# Patient Record
Sex: MALE | Race: WHITE | NOT HISPANIC OR LATINO | Employment: FULL TIME | URBAN - METROPOLITAN AREA
[De-identification: names, ages, dates, MRNs, and addresses within clinical notes are randomized per-mention and may not be internally consistent; named-entity substitution may affect disease eponyms.]

---

## 2017-03-30 ENCOUNTER — OFFICE VISIT (OUTPATIENT)
Dept: FAMILY MEDICINE | Age: 22
End: 2017-03-30

## 2017-03-30 VITALS
BODY MASS INDEX: 23.19 KG/M2 | HEIGHT: 72 IN | DIASTOLIC BLOOD PRESSURE: 64 MMHG | WEIGHT: 171.2 LBS | SYSTOLIC BLOOD PRESSURE: 132 MMHG | HEART RATE: 76 BPM

## 2017-03-30 DIAGNOSIS — M79.604 RIGHT LEG PAIN: ICD-10-CM

## 2017-03-30 DIAGNOSIS — Z00.00 ANNUAL PHYSICAL EXAM: Primary | ICD-10-CM

## 2017-03-30 PROCEDURE — 99395 PREV VISIT EST AGE 18-39: CPT | Performed by: FAMILY MEDICINE

## 2017-05-11 ENCOUNTER — E-ADVICE (OUTPATIENT)
Dept: FAMILY MEDICINE | Age: 22
End: 2017-05-11

## 2017-05-11 DIAGNOSIS — N50.9 TESTICLE TROUBLE: Primary | ICD-10-CM

## 2017-05-24 ENCOUNTER — OFFICE VISIT (OUTPATIENT)
Dept: UROLOGY | Age: 22
End: 2017-05-24
Attending: FAMILY MEDICINE

## 2017-05-24 VITALS
SYSTOLIC BLOOD PRESSURE: 130 MMHG | BODY MASS INDEX: 23.06 KG/M2 | DIASTOLIC BLOOD PRESSURE: 70 MMHG | HEART RATE: 72 BPM | WEIGHT: 170 LBS

## 2017-05-24 DIAGNOSIS — N50.9 TESTICULAR DISORDER: Primary | ICD-10-CM

## 2017-05-24 LAB
BILIRUB UR QL: NORMAL
CLARITY UR: CLEAR
COLOR UR: YELLOW
CULT IF POS: NORMAL
GLUCOSE UR-MCNC: NORMAL MG/DL
HGB UR QL: NORMAL
KETONES UR-MCNC: NORMAL MG/DL
LEUKOCYTE ESTERASE UR QL STRIP: NORMAL
NITRITE UR QL: NORMAL
PH UR: 7.5 [PH]
PROT UR QL: NORMAL
SP GR UR STRIP: 1.02
UROBILINOGEN UR STRIP-MCNC: NORMAL MG/DL

## 2017-05-24 PROCEDURE — 81003 URINALYSIS AUTO W/O SCOPE: CPT | Performed by: UROLOGY

## 2017-05-24 PROCEDURE — 99242 OFF/OP CONSLTJ NEW/EST SF 20: CPT | Performed by: UROLOGY

## 2017-08-24 ENCOUNTER — E-ADVICE (OUTPATIENT)
Dept: FAMILY MEDICINE | Age: 22
End: 2017-08-24

## 2017-08-24 DIAGNOSIS — Z13.220 LIPID SCREENING: Primary | ICD-10-CM

## 2018-07-24 ENCOUNTER — IMAGING SERVICES (OUTPATIENT)
Dept: GENERAL RADIOLOGY | Age: 23
End: 2018-07-24
Attending: FAMILY MEDICINE

## 2018-07-24 ENCOUNTER — OFFICE VISIT (OUTPATIENT)
Dept: FAMILY MEDICINE | Age: 23
End: 2018-07-24

## 2018-07-24 VITALS
WEIGHT: 171.2 LBS | SYSTOLIC BLOOD PRESSURE: 146 MMHG | BODY MASS INDEX: 23.22 KG/M2 | DIASTOLIC BLOOD PRESSURE: 96 MMHG | HEART RATE: 64 BPM

## 2018-07-24 DIAGNOSIS — R03.0 BLOOD PRESSURE ELEVATED WITHOUT HISTORY OF HTN: ICD-10-CM

## 2018-07-24 DIAGNOSIS — R07.0 THROAT PAIN: ICD-10-CM

## 2018-07-24 DIAGNOSIS — R07.0 THROAT PAIN: Primary | ICD-10-CM

## 2018-07-24 PROCEDURE — 99214 OFFICE O/P EST MOD 30 MIN: CPT | Performed by: FAMILY MEDICINE

## 2018-07-24 PROCEDURE — 70360 X-RAY EXAM OF NECK: CPT | Performed by: RADIOLOGY

## 2018-07-24 ASSESSMENT — PATIENT HEALTH QUESTIONNAIRE - PHQ9
SUM OF ALL RESPONSES TO PHQ9 QUESTIONS 1 AND 2: 0
CLINICAL INTERPRETATION OF PHQ2 SCORE: 0

## 2018-09-17 ENCOUNTER — OFFICE VISIT (OUTPATIENT)
Dept: FAMILY MEDICINE | Age: 23
End: 2018-09-17

## 2018-09-17 ENCOUNTER — LAB SERVICES (OUTPATIENT)
Dept: LAB | Age: 23
End: 2018-09-17

## 2018-09-17 VITALS
HEIGHT: 73 IN | BODY MASS INDEX: 22.58 KG/M2 | HEART RATE: 76 BPM | DIASTOLIC BLOOD PRESSURE: 82 MMHG | SYSTOLIC BLOOD PRESSURE: 138 MMHG | WEIGHT: 170.4 LBS

## 2018-09-17 DIAGNOSIS — Z13.1 SCREENING FOR DIABETES MELLITUS (DM): ICD-10-CM

## 2018-09-17 DIAGNOSIS — Z23 NEED FOR VACCINATION: ICD-10-CM

## 2018-09-17 DIAGNOSIS — Z13.220 LIPID SCREENING: ICD-10-CM

## 2018-09-17 DIAGNOSIS — Z00.00 ANNUAL PHYSICAL EXAM: Primary | ICD-10-CM

## 2018-09-17 LAB
FASTING STATUS PATIENT QL REPORTED: 12 HRS
GLUCOSE SERPL-MCNC: 89 MG/DL (ref 65–99)

## 2018-09-17 PROCEDURE — 90688 IIV4 VACCINE SPLT 0.5 ML IM: CPT | Performed by: FAMILY MEDICINE

## 2018-09-17 PROCEDURE — 80061 LIPID PANEL: CPT | Performed by: INTERNAL MEDICINE

## 2018-09-17 PROCEDURE — 99395 PREV VISIT EST AGE 18-39: CPT | Performed by: FAMILY MEDICINE

## 2018-09-17 PROCEDURE — 82947 ASSAY GLUCOSE BLOOD QUANT: CPT | Performed by: INTERNAL MEDICINE

## 2018-09-17 PROCEDURE — 90471 IMMUNIZATION ADMIN: CPT | Performed by: FAMILY MEDICINE

## 2018-09-17 PROCEDURE — 36415 COLL VENOUS BLD VENIPUNCTURE: CPT | Performed by: INTERNAL MEDICINE

## 2018-09-18 LAB
CHOLEST SERPL-MCNC: 119 MG/DL
CHOLEST/HDLC SERPL: 1.9 {RATIO}
HDLC SERPL-MCNC: 64 MG/DL
LDLC SERPL-MCNC: 47 MG/DL
LENGTH OF FAST TIME PATIENT: 12 HRS
NONHDLC SERPL-MCNC: 55 MG/DL
TRIGL SERPL-MCNC: 38 MG/DL

## 2018-09-20 ENCOUNTER — E-ADVICE (OUTPATIENT)
Dept: FAMILY MEDICINE | Age: 23
End: 2018-09-20

## 2018-12-22 ENCOUNTER — HOSPITAL ENCOUNTER (EMERGENCY)
Age: 23
Discharge: HOME | End: 2018-12-22
Attending: EMERGENCY MEDICINE

## 2018-12-22 ENCOUNTER — APPOINTMENT (OUTPATIENT)
Dept: GENERAL RADIOLOGY | Age: 23
End: 2018-12-22
Attending: EMERGENCY MEDICINE

## 2018-12-22 VITALS
DIASTOLIC BLOOD PRESSURE: 69 MMHG | TEMPERATURE: 97.3 F | BODY MASS INDEX: 23.03 KG/M2 | HEIGHT: 72 IN | RESPIRATION RATE: 18 BRPM | HEART RATE: 88 BPM | WEIGHT: 170 LBS | OXYGEN SATURATION: 98 % | SYSTOLIC BLOOD PRESSURE: 128 MMHG

## 2018-12-22 DIAGNOSIS — J02.9 ACUTE PHARYNGITIS, UNSPECIFIED ETIOLOGY: Primary | ICD-10-CM

## 2018-12-22 LAB
FLUAV AG SPEC QL IF: NEGATIVE
FLUBV AG SPEC QL IF: NEGATIVE
S PYO AG THROAT QL: NEGATIVE
SPECIMEN SOURCE: NORMAL

## 2018-12-22 PROCEDURE — 87880 STREP A ASSAY W/OPTIC: CPT

## 2018-12-22 PROCEDURE — 71046 X-RAY EXAM CHEST 2 VIEWS: CPT | Performed by: RADIOLOGY

## 2018-12-22 PROCEDURE — 71046 X-RAY EXAM CHEST 2 VIEWS: CPT

## 2018-12-22 PROCEDURE — 99282 EMERGENCY DEPT VISIT SF MDM: CPT | Performed by: EMERGENCY MEDICINE

## 2018-12-22 PROCEDURE — 99283 EMERGENCY DEPT VISIT LOW MDM: CPT

## 2018-12-22 PROCEDURE — 87804 INFLUENZA ASSAY W/OPTIC: CPT

## 2018-12-22 PROCEDURE — 87081 CULTURE SCREEN ONLY: CPT

## 2018-12-22 ASSESSMENT — PAIN SCALES - GENERAL
PAINLEVEL_OUTOF10: 5
PAINLEVEL_OUTOF10: 5
PAINLEVEL_OUTOF10: 2

## 2018-12-22 ASSESSMENT — PAIN DESCRIPTION - PAIN TYPE: TYPE: .ACUTE PAIN

## 2018-12-24 LAB
REPORT STATUS (RPT): NORMAL
S PYO SPEC QL CULT: NORMAL
SPECIMEN SOURCE: NORMAL

## 2019-02-19 ENCOUNTER — E-ADVICE (OUTPATIENT)
Dept: FAMILY MEDICINE | Age: 24
End: 2019-02-19

## 2019-02-19 DIAGNOSIS — M25.559 ARTHRALGIA OF HIP, UNSPECIFIED LATERALITY: Primary | ICD-10-CM

## 2019-08-26 ENCOUNTER — OFFICE VISIT (OUTPATIENT)
Dept: FAMILY MEDICINE | Age: 24
End: 2019-08-26

## 2019-08-26 VITALS
SYSTOLIC BLOOD PRESSURE: 132 MMHG | DIASTOLIC BLOOD PRESSURE: 80 MMHG | HEIGHT: 71 IN | WEIGHT: 175.16 LBS | BODY MASS INDEX: 24.52 KG/M2 | HEART RATE: 84 BPM

## 2019-08-26 DIAGNOSIS — Z00.00 ANNUAL PHYSICAL EXAM: Primary | ICD-10-CM

## 2019-08-26 DIAGNOSIS — Z13.220 LIPID SCREENING: ICD-10-CM

## 2019-08-26 DIAGNOSIS — Z13.1 SCREENING FOR DIABETES MELLITUS (DM): ICD-10-CM

## 2019-08-26 PROCEDURE — 99395 PREV VISIT EST AGE 18-39: CPT | Performed by: FAMILY MEDICINE

## 2019-08-26 ASSESSMENT — PATIENT HEALTH QUESTIONNAIRE - PHQ9
2. FEELING DOWN, DEPRESSED OR HOPELESS: NOT AT ALL
SUM OF ALL RESPONSES TO PHQ9 QUESTIONS 1 AND 2: 0
SUM OF ALL RESPONSES TO PHQ9 QUESTIONS 1 AND 2: 0
1. LITTLE INTEREST OR PLEASURE IN DOING THINGS: NOT AT ALL

## 2020-02-23 ENCOUNTER — E-ADVICE (OUTPATIENT)
Dept: FAMILY MEDICINE | Age: 25
End: 2020-02-23

## 2020-03-04 ENCOUNTER — OFFICE VISIT (OUTPATIENT)
Dept: OPHTHALMOLOGY | Age: 25
End: 2020-03-04

## 2020-03-04 DIAGNOSIS — H52.203 ASTIGMATISM OF BOTH EYES, UNSPECIFIED TYPE: Primary | ICD-10-CM

## 2020-03-04 DIAGNOSIS — H43.393 VITREOUS FLOATERS, BILATERAL: ICD-10-CM

## 2020-03-04 DIAGNOSIS — H04.123 DRY EYES, BILATERAL: ICD-10-CM

## 2020-03-04 DIAGNOSIS — H10.13 ACUTE ATOPIC CONJUNCTIVITIS, BILATERAL: ICD-10-CM

## 2020-03-04 PROCEDURE — 92015 DETERMINE REFRACTIVE STATE: CPT | Performed by: OPTOMETRIST

## 2020-03-04 PROCEDURE — 92004 COMPRE OPH EXAM NEW PT 1/>: CPT | Performed by: OPTOMETRIST

## 2020-03-04 ASSESSMENT — VISUAL ACUITY
OS_CC: 20/20
METHOD: SNELLEN - LINEAR
OD_CC: 20/20
OS_SC: 20/25
OS_SC+: -1
OD_SC: 20/25
OD_SC+: -1

## 2020-03-04 ASSESSMENT — REFRACTION_WEARINGRX
OD_SPHERE: -0.50
OS_AXIS: 095
OS_AXIS: 095
OS_CYLINDER: +0.75
OD_AXIS: 095
OS_CYLINDER: +0.75
OD_CYLINDER: +0.75
OD_AXIS: 095
OD_CYLINDER: +0.75
OD_SPHERE: -0.25
OS_SPHERE: -0.25
OS_SPHERE: -0.25

## 2020-03-04 ASSESSMENT — TONOMETRY
IOP_METHOD: APPLANATION
OD_IOP_MMHG: 15
OS_IOP_MMHG: 14

## 2020-03-04 ASSESSMENT — EXTERNAL EXAM - RIGHT EYE: OD_EXAM: NORMAL

## 2020-03-04 ASSESSMENT — EXTERNAL EXAM - LEFT EYE: OS_EXAM: NORMAL

## 2020-03-04 ASSESSMENT — CUP TO DISC RATIO
OD_RATIO: .35
OS_RATIO: .35

## 2020-03-04 ASSESSMENT — REFRACTION_MANIFEST
OD_CYLINDER: +0.50
OS_AXIS: 080
OD_SPHERE: -0.75
OS_CYLINDER: +0.50
OD_AXIS: 075
OS_SPHERE: -0.75

## 2020-03-04 ASSESSMENT — SLIT LAMP EXAM - LIDS
COMMENTS: NORMAL
COMMENTS: NORMAL

## 2020-03-04 ASSESSMENT — CONF VISUAL FIELD
OD_NORMAL: 1
METHOD: COUNTING FINGERS
OS_NORMAL: 1

## 2020-09-28 ENCOUNTER — E-ADVICE (OUTPATIENT)
Dept: FAMILY MEDICINE | Age: 25
End: 2020-09-28

## 2020-09-28 DIAGNOSIS — R20.2 PARESTHESIA OF RIGHT LEG: Primary | ICD-10-CM

## 2020-10-07 ENCOUNTER — OFFICE VISIT (OUTPATIENT)
Dept: REHABILITATION | Age: 25
End: 2020-10-07
Attending: FAMILY MEDICINE

## 2020-10-07 DIAGNOSIS — G57.81 NERVE ENTRAPMENT OF LOWER LIMB, RIGHT: Primary | ICD-10-CM

## 2020-10-07 PROCEDURE — 97110 THERAPEUTIC EXERCISES: CPT | Performed by: PHYSICAL THERAPIST

## 2020-10-07 PROCEDURE — 97161 PT EVAL LOW COMPLEX 20 MIN: CPT | Performed by: PHYSICAL THERAPIST

## 2020-10-07 ASSESSMENT — MOVEMENT AND STRENGTH ASSESSMENTS
SQUATTING: NO DIFFICULTY
WALKING 2 BLOCKS: NO DIFFICULTY
PERFORMING LIGHT ACTIVITES AROUND YOUR HOME: NO DIFFICULTY
PERFORMING HEAVY ACTIVITIES AROUND YOUR HOME: A LITTLE BIT OF DIFFICULTY
SITTING FOR 1 HOUR: NO DIFFICULTY
GOING UP OR DOWN 10 STAIRS (ABOUT 1 FLIGHT OF STAIRS): NO DIFFICULTY
GETTING INTO OR OUT OF THE BATH: NO DIFFICULTY
MAKING SHARP TURNS WHILE RUNNING FAST: MODERATE DIFFICULTY
LIFTING AN OBJECT, LIKE A BAG OF GROCERIES, FROM THE FLOOR: NO DIFFICULTY
WALKING A MILE: NO DIFFICULTY
WALKING BETWEEN ROOMS: NO DIFFICULTY
RUNNING ON EVEN GROUND: MODERATE DIFFICULTY
YOUR USUAL HOBBIES, RECREATIONAL OR SPORTING ACTIVIITIES: MODERATE DIFFICULTY
ANY OF YOUR USUAL WORK, HOUSEWORK OR SCHOOL ACTIVITIES: NO DIFFICULTY
HOPPING: A LITTLE BIT OF DIFFICULTY
ROLLING OVER IN BED: NO DIFFICULTY
STANDING FOR 1 HOUR: A LITTLE BIT OF DIFFICULTY
TOTAL SCORE: 86.25
RUNNING ON UNEVEN GROUND: MODERATE DIFFICULTY
PUTTING ON YOUR SHOES OR SOCKS: NO DIFFICULTY
GETTING INTO OR OUT OF A CAR: NO DIFFICULTY

## 2020-10-07 ASSESSMENT — ENCOUNTER SYMPTOMS
QUALITY: ACHE
PAIN SEVERITY NOW: 0
QUALITY: PINS AND NEEDLES
QUALITY: SHARP
PAIN SCALE AT HIGHEST: 5
SUBJECTIVE PAIN PROGRESSION: IMPROVED
ALLEVIATING FACTORS: AVOIDING MOVEMENT IN INVOLVED AREA
QUALITY: TIGHT

## 2020-10-15 ENCOUNTER — OFFICE VISIT (OUTPATIENT)
Dept: FAMILY MEDICINE | Age: 25
End: 2020-10-15

## 2020-10-15 VITALS
SYSTOLIC BLOOD PRESSURE: 130 MMHG | HEIGHT: 72 IN | DIASTOLIC BLOOD PRESSURE: 90 MMHG | WEIGHT: 173.61 LBS | HEART RATE: 72 BPM | BODY MASS INDEX: 23.52 KG/M2

## 2020-10-15 DIAGNOSIS — Z13.1 SCREENING FOR DIABETES MELLITUS (DM): ICD-10-CM

## 2020-10-15 DIAGNOSIS — Z23 NEED FOR VACCINATION: ICD-10-CM

## 2020-10-15 DIAGNOSIS — Z00.00 ANNUAL PHYSICAL EXAM: Primary | ICD-10-CM

## 2020-10-15 DIAGNOSIS — Z13.220 LIPID SCREENING: ICD-10-CM

## 2020-10-15 PROCEDURE — 90471 IMMUNIZATION ADMIN: CPT | Performed by: FAMILY MEDICINE

## 2020-10-15 PROCEDURE — 90715 TDAP VACCINE 7 YRS/> IM: CPT | Performed by: FAMILY MEDICINE

## 2020-10-15 PROCEDURE — 90686 IIV4 VACC NO PRSV 0.5 ML IM: CPT | Performed by: FAMILY MEDICINE

## 2020-10-15 PROCEDURE — 90472 IMMUNIZATION ADMIN EACH ADD: CPT | Performed by: FAMILY MEDICINE

## 2020-10-15 PROCEDURE — 99395 PREV VISIT EST AGE 18-39: CPT | Performed by: FAMILY MEDICINE

## 2020-10-15 SDOH — HEALTH STABILITY: MENTAL HEALTH: HOW OFTEN DO YOU HAVE A DRINK CONTAINING ALCOHOL?: 2-3 TIMES A WEEK

## 2020-10-15 SDOH — HEALTH STABILITY: MENTAL HEALTH: HOW OFTEN DO YOU HAVE 6 OR MORE DRINKS ON ONE OCCASION?: LESS THAN MONTHLY

## 2020-10-15 SDOH — HEALTH STABILITY: MENTAL HEALTH: HOW MANY STANDARD DRINKS CONTAINING ALCOHOL DO YOU HAVE ON A TYPICAL DAY?: 1 OR 2

## 2020-10-15 ASSESSMENT — PATIENT HEALTH QUESTIONNAIRE - PHQ9
CLINICAL INTERPRETATION OF PHQ9 SCORE: NO FURTHER SCREENING NEEDED
1. LITTLE INTEREST OR PLEASURE IN DOING THINGS: NOT AT ALL
CLINICAL INTERPRETATION OF PHQ2 SCORE: NO FURTHER SCREENING NEEDED
2. FEELING DOWN, DEPRESSED OR HOPELESS: NOT AT ALL
SUM OF ALL RESPONSES TO PHQ9 QUESTIONS 1 AND 2: 0
SUM OF ALL RESPONSES TO PHQ9 QUESTIONS 1 AND 2: 0

## 2022-01-02 ENCOUNTER — E-ADVICE (OUTPATIENT)
Dept: FAMILY MEDICINE | Age: 27
End: 2022-01-02

## 2022-03-04 ENCOUNTER — OFFICE VISIT (OUTPATIENT)
Dept: FAMILY MEDICINE | Age: 27
End: 2022-03-04

## 2022-03-04 ENCOUNTER — LAB SERVICES (OUTPATIENT)
Dept: LAB | Age: 27
End: 2022-03-04

## 2022-03-04 VITALS
HEIGHT: 72 IN | HEART RATE: 80 BPM | DIASTOLIC BLOOD PRESSURE: 76 MMHG | BODY MASS INDEX: 24.95 KG/M2 | OXYGEN SATURATION: 99 % | RESPIRATION RATE: 16 BRPM | SYSTOLIC BLOOD PRESSURE: 124 MMHG | WEIGHT: 184.2 LBS

## 2022-03-04 DIAGNOSIS — F41.9 ANXIETY: ICD-10-CM

## 2022-03-04 DIAGNOSIS — Z00.00 ANNUAL PHYSICAL EXAM: Primary | ICD-10-CM

## 2022-03-04 DIAGNOSIS — Z13.1 SCREENING FOR DIABETES MELLITUS (DM): ICD-10-CM

## 2022-03-04 DIAGNOSIS — Z13.220 LIPID SCREENING: ICD-10-CM

## 2022-03-04 DIAGNOSIS — Z23 NEED FOR VACCINATION: ICD-10-CM

## 2022-03-04 LAB
FASTING DURATION TIME PATIENT: 12 HOURS (ref 0–999)
GLUCOSE SERPL-MCNC: 94 MG/DL (ref 70–99)

## 2022-03-04 PROCEDURE — 99395 PREV VISIT EST AGE 18-39: CPT | Performed by: FAMILY MEDICINE

## 2022-03-04 PROCEDURE — 36415 COLL VENOUS BLD VENIPUNCTURE: CPT | Performed by: INTERNAL MEDICINE

## 2022-03-04 PROCEDURE — 90471 IMMUNIZATION ADMIN: CPT | Performed by: FAMILY MEDICINE

## 2022-03-04 PROCEDURE — 80061 LIPID PANEL: CPT | Performed by: INTERNAL MEDICINE

## 2022-03-04 PROCEDURE — 82947 ASSAY GLUCOSE BLOOD QUANT: CPT | Performed by: INTERNAL MEDICINE

## 2022-03-04 PROCEDURE — 90686 IIV4 VACC NO PRSV 0.5 ML IM: CPT | Performed by: FAMILY MEDICINE

## 2022-03-04 RX ORDER — PROPRANOLOL HYDROCHLORIDE 40 MG/1
40 TABLET ORAL DAILY PRN
Qty: 20 TABLET | Refills: 1 | Status: SHIPPED | OUTPATIENT
Start: 2022-03-04

## 2022-03-04 ASSESSMENT — PATIENT HEALTH QUESTIONNAIRE - PHQ9
SUM OF ALL RESPONSES TO PHQ9 QUESTIONS 1 AND 2: 0
SUM OF ALL RESPONSES TO PHQ9 QUESTIONS 1 AND 2: 0
2. FEELING DOWN, DEPRESSED OR HOPELESS: NOT AT ALL
1. LITTLE INTEREST OR PLEASURE IN DOING THINGS: NOT AT ALL
CLINICAL INTERPRETATION OF PHQ2 SCORE: NO FURTHER SCREENING NEEDED

## 2022-03-05 LAB
CHOLEST SERPL-MCNC: 123 MG/DL
CHOLEST/HDLC SERPL: 1.6 {RATIO}
FASTING DURATION TIME PATIENT: 12 HOURS (ref 0–999)
HDLC SERPL-MCNC: 79 MG/DL
LDLC SERPL CALC-MCNC: 36 MG/DL
NONHDLC SERPL-MCNC: 44 MG/DL
TRIGL SERPL-MCNC: 39 MG/DL

## 2023-07-24 ENCOUNTER — TELEPHONE (OUTPATIENT)
Dept: URGENT CARE | Facility: URGENT CARE | Age: 28
End: 2023-07-24

## 2023-07-24 ENCOUNTER — OFFICE VISIT (OUTPATIENT)
Dept: URGENT CARE | Facility: URGENT CARE | Age: 28
End: 2023-07-24
Payer: COMMERCIAL

## 2023-07-24 ENCOUNTER — ANCILLARY PROCEDURE (OUTPATIENT)
Dept: GENERAL RADIOLOGY | Facility: CLINIC | Age: 28
End: 2023-07-24
Attending: NURSE PRACTITIONER
Payer: COMMERCIAL

## 2023-07-24 VITALS
TEMPERATURE: 98.3 F | HEART RATE: 77 BPM | DIASTOLIC BLOOD PRESSURE: 87 MMHG | OXYGEN SATURATION: 98 % | SYSTOLIC BLOOD PRESSURE: 137 MMHG | WEIGHT: 182 LBS

## 2023-07-24 DIAGNOSIS — M25.552 HIP PAIN, LEFT: ICD-10-CM

## 2023-07-24 DIAGNOSIS — M25.552 HIP PAIN, LEFT: Primary | ICD-10-CM

## 2023-07-24 DIAGNOSIS — S76.312A STRAIN OF LEFT HAMSTRING MUSCLE, INITIAL ENCOUNTER: ICD-10-CM

## 2023-07-24 PROCEDURE — 99203 OFFICE O/P NEW LOW 30 MIN: CPT | Performed by: NURSE PRACTITIONER

## 2023-07-24 PROCEDURE — 73522 X-RAY EXAM HIPS BI 3-4 VIEWS: CPT | Mod: TC | Performed by: RADIOLOGY

## 2023-07-24 ASSESSMENT — ENCOUNTER SYMPTOMS
BACK PAIN: 0
NECK STIFFNESS: 0
NECK PAIN: 0
FATIGUE: 0
FEVER: 0

## 2023-07-24 NOTE — TELEPHONE ENCOUNTER
Patient called in stating their pain was mush worse. I notified the provider and patient was referred to [facility]. Patient is aware and states he has no further questions or concerns at this time.     SIMONE BREWER MA on 7/24/2023 at 5:47 PM

## 2023-07-24 NOTE — PATIENT INSTRUCTIONS
Use ibuprofen as needed along with diclofenac gel. Follow up with PT and ortho as needed. If anything worsens go to ER.

## 2023-07-24 NOTE — PROGRESS NOTES
Assessment & Plan       ICD-10-CM    1. Hip pain, left  M25.552 XR Pelvis w 2 VW Hips Bilateral      2. Strain of left hamstring muscle, initial encounter  S76.312A diclofenac (VOLTAREN) 1 % topical gel     Orthopedic  Referral     Physical Therapy Referral           Patient instructions:  Use ibuprofen as needed along with diclofenac gel. Follow up with PT and ortho as needed. If anything worsens go to ER.     Medical decision making:  Pt presents with hip/glute/hamstring pain that started after bouldering earlier today. Xray ordered and no acute fracture noted by radiology. Pt is ambulatory in clinic reporting pain worse with reaching toward feet. Feel this is most likely a pulled or torn hamstring where he needs to follow up with Ortho if pain does not improve over the next few days. Rx given for diclofenac to take along with ibuprofen. If no improvement follow up with ortho, any worsening symptoms go to ER. Pt agrees with plan. Ace wrap given.     Results for orders placed or performed in visit on 07/24/23   XR Pelvis w 2 VW Hips Bilateral     Status: None    Narrative    XR PELVIS W 2 VW HIPS BILATERAL  7/24/2023 3:34 PM     HISTORY: Hip pain, left  COMPARISON: None      Impression    IMPRESSION: No acute fracture or malalignment. There is normal joint  spacing.     FRANKY BROCK MD         SYSTEM ID:  [id number]         No follow-ups on file.    At the end of the encounter, I discussed results, diagnosis, medications. Discussed red flags for immediate return to clinic/ER, as well as indications for follow up if no improvement. Patient understood and agreed to plan. Patient was stable for discharge.    Usha Bernardo is a 28 year old male who presents to clinic today the following health issues:  Chief Complaint   Patient presents with    Musculoskeletal Problem     Start today-1 hr ago bouldering sx left hip pain currently-4/10 at worst 10/10 heard a pop, ROM limited, dragging left leg tx  none      Pt reports he was bouldering earlier today and went to push off his leg when he felt a pop in his left him that radiated down his hamstring. Ambulatory but with limp. Reports pain worse with bending over. Denies any numbness, tingling, weakness to leg.             Review of Systems   Constitutional:  Negative for fatigue and fever.   Musculoskeletal:  Positive for gait problem. Negative for back pain, neck pain and neck stiffness.       Problem List:  There are no relevant problems documented for this patient.      History reviewed. No pertinent past medical history.    Social History     Tobacco Use    Smoking status: Never    Smokeless tobacco: Never   Substance Use Topics    Alcohol use: Not on file           Objective    /87   Pulse 77   Temp 98.3  F (36.8  C)   Wt 82.6 kg (182 lb)   SpO2 98%   Physical Exam  Constitutional:       Appearance: Normal appearance. He is not ill-appearing, toxic-appearing or diaphoretic.   Cardiovascular:      Rate and Rhythm: Normal rate.   Pulmonary:      Effort: Pulmonary effort is normal.   Musculoskeletal:        Legs:       Comments: Pain in left hamstring from glute muscle down to above knee. Increased with movement. No increased swelling or redness noted. Full ROM of hip joint noted. No popping or clicking with movement.    Skin:     Capillary Refill: Capillary refill takes less than 2 seconds.   Neurological:      Mental Status: He is alert.              YVETTE OJEDA CNP

## 2023-08-07 ENCOUNTER — OFFICE VISIT (OUTPATIENT)
Dept: FAMILY MEDICINE | Facility: CLINIC | Age: 28
End: 2023-08-07
Payer: COMMERCIAL

## 2023-08-07 VITALS
OXYGEN SATURATION: 99 % | BODY MASS INDEX: 24.19 KG/M2 | DIASTOLIC BLOOD PRESSURE: 66 MMHG | RESPIRATION RATE: 20 BRPM | TEMPERATURE: 98.3 F | WEIGHT: 178.6 LBS | HEIGHT: 72 IN | SYSTOLIC BLOOD PRESSURE: 126 MMHG | HEART RATE: 72 BPM

## 2023-08-07 DIAGNOSIS — S76.312A RUPTURE OF LEFT PROXIMAL HAMSTRING TENDON, INITIAL ENCOUNTER: ICD-10-CM

## 2023-08-07 DIAGNOSIS — Z01.818 PRE-OP EXAM: Primary | ICD-10-CM

## 2023-08-07 LAB — HGB BLD-MCNC: 14 G/DL (ref 13.3–17.7)

## 2023-08-07 PROCEDURE — 99214 OFFICE O/P EST MOD 30 MIN: CPT | Performed by: FAMILY MEDICINE

## 2023-08-07 PROCEDURE — 85018 HEMOGLOBIN: CPT | Performed by: FAMILY MEDICINE

## 2023-08-07 PROCEDURE — 36415 COLL VENOUS BLD VENIPUNCTURE: CPT | Performed by: FAMILY MEDICINE

## 2023-08-07 ASSESSMENT — PAIN SCALES - GENERAL: PAINLEVEL: MODERATE PAIN (4)

## 2023-08-07 NOTE — PROGRESS NOTES
[address] [address]  Phone: [phone]  Primary Provider: No Ref-Primary, Physician  Pre-op Performing Provider: EMANUEL AVENDANO      PREOPERATIVE EVALUATION:  Today's date: 8/7/2023    Az Capps is a 28 year old male who presents for a preoperative evaluation.      8/7/2023     3:52 PM   Additional Questions   Roomed by margarito       Surgical Information:  Surgery/Procedure: Hamstring repair   Surgery Location: [facility]  Surgeon: Dr Alejandro Noonan  Surgery Date: 8/8/2023  Time of Surgery: tbd  Where patient plans to recover: At home with family  Fax number for surgical facility: [phone]    Assessment & Plan     The proposed surgical procedure is considered LOW risk.    Pre-op exam    - Hemoglobin; Future  - Hemoglobin    Rupture of left proximal hamstring tendon, initial encounter    - Hemoglobin; Future  - Hemoglobin            - No identified additional risk factors other than previously addressed    Antiplatelet or Anticoagulation Medication Instructions:   - Patient is on no antiplatelet or anticoagulation medications.    Additional Medication Instructions:  Patient is on no additional chronic medications    RECOMMENDATION:  APPROVAL GIVEN to proceed with proposed procedure, without further diagnostic evaluation.          Subjective             8/7/2023     3:48 PM   Preop Questions   1. Have you ever had a heart attack or stroke? No   2. Have you ever had surgery on your heart or blood vessels, such as a stent placement, a coronary artery bypass, or surgery on an artery in your head, neck, heart, or legs? No   3. Do you have chest pain with activity? No   4. Do you have a history of  heart failure? No   5. Do you currently have a cold, bronchitis or symptoms of other infection? No   6. Do you have a cough, shortness of breath, or wheezing? No   7. Do you or anyone in your family have previous history of blood clots? No   8. Do you or  does anyone in your family have a serious bleeding problem such as prolonged bleeding following surgeries or cuts? No   9. Have you ever had problems with anemia or been told to take iron pills? No   10. Have you had any abnormal blood loss such as black, tarry or bloody stools? No   11. Have you ever had a blood transfusion? No   12. Are you willing to have a blood transfusion if it is medically needed before, during, or after your surgery? Yes   13. Have you or any of your relatives ever had problems with anesthesia? No   14. Do you have sleep apnea, excessive snoring or daytime drowsiness? No   15. Do you have any artifical heart valves or other implanted medical devices like a pacemaker, defibrillator, or continuous glucose monitor? No   16. Do you have artificial joints? No   17. Are you allergic to latex? No     Health Care Directive:  Patient does not have a Health Care Directive or Living Will: Discussed advance care planning with patient; information given to patient to review.    Preoperative Review of :   reviewed - no record of controlled substances prescribed.      Status of Chronic Conditions:  See problem list for active medical problems.  Problems all longstanding and stable, except as noted/documented.  See ROS for pertinent symptoms related to these conditions.    Review of Systems  CONSTITUTIONAL: NEGATIVE for fever, chills, change in weight  ENT/MOUTH: NEGATIVE for ear, mouth and throat problems  RESP: NEGATIVE for significant cough or SOB  CV: NEGATIVE for chest pain, palpitations or peripheral edema    There are no problems to display for this patient.     No past medical history on file.  No past surgical history on file.  No current outpatient medications on file.       Allergies   Allergen Reactions    Gentamycin [Gentamicin]      Gentamicin eye drops-redness and bumpy eye        Social History     Tobacco Use    Smoking status: Never    Smokeless tobacco: Never   Substance Use Topics     Alcohol use: Not on file     No family history on file.  History   Drug Use Not on file         Objective     /66 (BP Location: Right arm, Patient Position: Sitting, Cuff Size: Adult Regular)   Pulse 72   Temp 98.3  F (36.8  C) (Oral)   Resp 20   Ht 1.829 m (6')   Wt 81 kg (178 lb 9.6 oz)   SpO2 99%   BMI 24.22 kg/m      Physical Exam  GENERAL APPEARANCE: healthy, alert and no distress  HENT: ear canals and TM's normal and nose and mouth without ulcers or lesions  RESP: lungs clear to auscultation - no rales, rhonchi or wheezes  CV: regular rate and rhythm, normal S1 S2, no S3 or S4 and no murmur, click or rub   ABDOMEN: soft, nontender, no HSM or masses and bowel sounds normal  NEURO: Normal strength and tone, sensory exam grossly normal, mentation intact and speech normal    No results for input(s): HGB, PLT, INR, NA, POTASSIUM, CR, A1C in the last 73220 hours.     Diagnostics:  Recent Results (from the past 24 hour(s))   Hemoglobin    Collection Time: 08/07/23  4:24 PM   Result Value Ref Range    Hemoglobin 14.0 13.3 - 17.7 g/dL      No EKG required, no history of coronary heart disease, significant arrhythmia, peripheral arterial disease or other structural heart disease.    Revised Cardiac Risk Index (RCRI):  The patient has the following serious cardiovascular risks for perioperative complications:   - No serious cardiac risks = 0 points     RCRI Interpretation: 0 points: Class I (very low risk - 0.4% complication rate)         Signed Electronically by: Tristen Villar MD  Copy of this evaluation report is provided to requesting physician.

## 2023-08-13 ENCOUNTER — HEALTH MAINTENANCE LETTER (OUTPATIENT)
Age: 28
End: 2023-08-13

## 2024-10-06 ENCOUNTER — HEALTH MAINTENANCE LETTER (OUTPATIENT)
Age: 29
End: 2024-10-06